# Patient Record
Sex: FEMALE | Race: WHITE | NOT HISPANIC OR LATINO | Employment: FULL TIME | URBAN - METROPOLITAN AREA
[De-identification: names, ages, dates, MRNs, and addresses within clinical notes are randomized per-mention and may not be internally consistent; named-entity substitution may affect disease eponyms.]

---

## 2018-10-04 ENCOUNTER — HISTORICAL (OUTPATIENT)
Dept: ADMINISTRATIVE | Facility: HOSPITAL | Age: 48
End: 2018-10-04

## 2018-10-04 LAB
ABS NEUT (OLG): 2.1
ALBUMIN SERPL-MCNC: 4.4 GM/DL (ref 3.4–5)
ALBUMIN/GLOB SERPL: 1.52 {RATIO} (ref 1.5–2.5)
ALP SERPL-CCNC: 91 UNIT/L (ref 38–126)
ALT SERPL-CCNC: 17 UNIT/L (ref 7–52)
AST SERPL-CCNC: 23 UNIT/L (ref 15–37)
BILIRUB SERPL-MCNC: 0.5 MG/DL (ref 0.2–1)
BILIRUBIN DIRECT+TOT PNL SERPL-MCNC: 0.1 MG/DL (ref 0–0.5)
BILIRUBIN DIRECT+TOT PNL SERPL-MCNC: 0.4 MG/DL
BUN SERPL-MCNC: 6 MG/DL (ref 7–18)
CALCIUM SERPL-MCNC: 9.2 MG/DL (ref 8.5–10)
CHLORIDE SERPL-SCNC: 101 MMOL/L (ref 98–107)
CO2 SERPL-SCNC: 31 MMOL/L (ref 21–32)
CREAT SERPL-MCNC: 0.72 MG/DL (ref 0.6–1.3)
ERYTHROCYTE [DISTWIDTH] IN BLOOD BY AUTOMATED COUNT: 13 % (ref 11.5–17)
GLOBULIN SER-MCNC: 2.8 GM/DL (ref 1.2–3)
GLUCOSE SERPL-MCNC: 102 MG/DL (ref 74–106)
HCT VFR BLD AUTO: 44.5 % (ref 37–47)
HGB BLD-MCNC: 14.3 GM/DL (ref 12–16)
LYMPHOCYTES # BLD AUTO: 2.9 X10(3)/MCL (ref 0.6–3.4)
LYMPHOCYTES NFR BLD AUTO: 51.9 % (ref 13–40)
MCH RBC QN AUTO: 31.1 PG (ref 27–31.2)
MCHC RBC AUTO-ENTMCNC: 32 GM/DL (ref 32–36)
MCV RBC AUTO: 97 FL (ref 80–94)
MONOCYTES # BLD AUTO: 0.5 X10(3)/MCL (ref 0–1.8)
MONOCYTES NFR BLD AUTO: 8.4 % (ref 0.1–24)
NEUTROPHILS NFR BLD AUTO: 39.7 % (ref 47–80)
PLATELET # BLD AUTO: 184 X10(3)/MCL (ref 130–400)
PMV BLD AUTO: 10.2 FL
POTASSIUM SERPL-SCNC: 4.6 MMOL/L (ref 3.5–5.1)
PROT SERPL-MCNC: 7.3 GM/DL (ref 6.4–8.2)
RBC # BLD AUTO: 4.6 X10(6)/MCL (ref 4.2–5.4)
SODIUM SERPL-SCNC: 139 MMOL/L (ref 136–145)
TSH SERPL-ACNC: 1.87 MIU/ML (ref 0.35–4.94)
WBC # SPEC AUTO: 5.5 X10(3)/MCL (ref 4.5–11.5)

## 2019-12-24 LAB
INFLUENZA A ANTIGEN, POC: NEGATIVE
INFLUENZA B ANTIGEN, POC: NEGATIVE
RAPID GROUP A STREP (OHS): NEGATIVE

## 2021-03-16 LAB — RAPID GROUP A STREP (OHS): POSITIVE

## 2021-04-28 LAB — RAPID GROUP A STREP (OHS): NEGATIVE

## 2021-06-14 ENCOUNTER — HISTORICAL (OUTPATIENT)
Dept: ADMINISTRATIVE | Facility: HOSPITAL | Age: 51
End: 2021-06-14

## 2022-04-10 ENCOUNTER — HISTORICAL (OUTPATIENT)
Dept: ADMINISTRATIVE | Facility: HOSPITAL | Age: 52
End: 2022-04-10

## 2022-04-28 VITALS
BODY MASS INDEX: 38.33 KG/M2 | SYSTOLIC BLOOD PRESSURE: 96 MMHG | OXYGEN SATURATION: 97 % | WEIGHT: 208.31 LBS | DIASTOLIC BLOOD PRESSURE: 54 MMHG | HEIGHT: 62 IN

## 2022-05-03 NOTE — HISTORICAL [facility]
This is a historical note converted from Isaiah. Formatting and pictures may have been removed.  Please reference Isaiah for original formatting and attached multimedia. Chief Complaint  DIARRHEA SINCE SUNDAY. SWEATING PROFUSELY ALL DAY YESTERDAY. HAS 2 SYNCOPE EPISODES YESTERDAY. CONCNEREND ABOUT DEHYDRATION.  History of Present Illness  Patient is here today?for evaluation of syncopal episode?that happened x2 yesterday.? She reports that since Sunday?she has had diarrhea?but no fever. ?No nausea vomiting.? She went to the walk-in clinic yesterday?and was evaluated?but referred to the emergency room where?further workup?could be performed.? She has not had any further episodes of syncope since yesterday.? She does report having?similar episodes of syncope?in the past?and she?has always been told?that it was possibly related to dehydration.? She denies any palpitations?but does usually have?a low blood pressure?as her baseline.? She has not had any further diarrhea since?yesterday.  ?  ?  Review of Systems  General:?No fever  HEENT: Noncontributory  Cardiac:?No chest pain or palpitations  GI:?Diarrhea? as HPI, no bleeding, no abdominal pain, no nausea vomiting  : Noncontributory  Neuro:?She has had a history of syncope in the past?that was worked up?without any?underlying cause discovered, no history of seizure disorder  Physical Exam  Vitals & Measurements  HR:?74(Peripheral)? BP:?102/66?  HT:?157.4?cm? HT:?157.4?cm? WT:?49.8?kg? WT:?49.8?kg? BMI:?20.1?  General:?No apparent distress, alert and oriented  HEENT: Mucous membranes are moist, PERRLA, EOMI  Chest: Clear to auscultation bilaterally  Cardiac: Regular rate and rhythm  Abdomen:?Soft nontender nondistended, normal bowel sounds x4  Assessment/Plan  1.?Syncopal episodes  CBC, CMP, TSH, EkG within normal limits.? Explained to patient?that it is very common?to have syncopal episodes without?an underlying?cause discovered.? It is possible?since her blood  pressure?at a baseline?is on the low side?and any fluctuation in her fluid status/dehydration as?may occur with?diarrhea, ?might make her more susceptible to fainting.? Recommend that she try adding?additional salt?to her diet.? Discussed referral for cardiac evaluation,?such as Holter or tilt table?but will hold off on this for now?and if it continues to be a frequent problem?patient will notify me?to send referral or if recurs?in the situation that is not related to dehydration.  Ordered:  Comprehensive Metabolic Panel, Routine collect, 10/04/18 15:55:00 CDT, Blood, Stop date 10/04/18 15:55:00 CDT, Lab Collect, Syncopal episodes, 10/04/18 15:55:00 CDT  Office/Outpatient Visit Level 3 Established 14806 PC, Diarrhea  Syncopal episodes, [facility], 10/04/18 15:53:00 CDT  Thyroid Stimulating Hormone, Routine collect, 10/04/18 15:55:00 CDT, Blood, Stop date 10/04/18 15:55:00 CDT, Lab Collect, Syncopal episodes  Diarrhea, 10/04/18 15:55:00 CDT  ?  2.?Diarrhea  Resolved  Ordered:  Office/Outpatient Visit Level 3 Established 59378 PC, Diarrhea  Syncopal episodes, [facility], 10/04/18 15:53:00 CDT  Thyroid Stimulating Hormone, Routine collect, 10/04/18 15:55:00 CDT, Blood, Stop date 10/04/18 15:55:00 CDT, Lab Collect, Syncopal episodes  Diarrhea, 10/04/18 15:55:00 CDT  ?  Orders:  Clinic Follow-up PRN, 10/04/18 15:53:00 CDT, [facility], Future Order   Problem List/Past Medical History  Ongoing  Diarrhea  Insomnia  Restless legs syndrome  Syncopal episodes  Historical  Endometriosis(  Confirmed  )  Procedure/Surgical History  BREAST AUGMENTATION (08/30/2018)  hysterectomy (2015)  Endometriosis   Medications  Allegra, Oral  KlonoPIN 1 mg oral tablet, 1 mg= 1 tab(s), Oral, TID  Vitamin C 1000 mg oral tablet, 1000 mg= 1 tab(s), Oral, Daily  Allergies  No Known Allergies  Social History  Alcohol  Current, Beer, Wine, Liquor, 3-5 times per week, 03/28/2018  Substance Abuse  Never, 03/28/2018  Tobacco  Never  smoker, 05/11/2015  Family History  Diabetes mellitus type 2: Father.  Health Maintenance  Health Maintenance  ???Pending?(in the next year)  ??? ??Due?  ??? ? ? ?ADL Screening due??10/04/18??and every 1??year(s)  ??? ? ? ?Alcohol Misuse Screening due??10/04/18??and every 1??year(s)  ??? ? ? ?Cervical Cancer Screening due??10/04/18??and every?  ??? ? ? ?Depression Screening due??10/04/18??and every?  ??? ? ? ?Diabetes Screening due??10/04/18??and every?  ??? ? ? ?Influenza Vaccine due??10/04/18??and every?  ??? ? ? ?Lipid Screening due??10/04/18??and every?  ??? ? ? ?Tetanus Vaccine due??10/04/18??and every 10??year(s)  ???Satisfied?(in the past 1 year)  ??? ??Satisfied?  ??? ? ? ?Blood Pressure Screening on??10/04/18.??Satisfied by Melina Sharma.  ??? ? ? ?Body Mass Index Check on??10/04/18.??Satisfied by Melina Sharma  ??? ? ? ?Diabetes Screening on??10/04/18.??Satisfied by Daryn Umana MD  ??? ? ? ?Influenza Vaccine on??10/04/18.??Satisfied by Melina Sharma  ??? ? ? ?Obesity Screening on??10/04/18.??Satisfied by Melina Sharma  ?  ?

## 2022-05-12 PROCEDURE — 85651 RBC SED RATE NONAUTOMATED: CPT | Performed by: FAMILY MEDICINE

## 2022-05-12 PROCEDURE — 86617 LYME DISEASE ANTIBODY: CPT | Performed by: FAMILY MEDICINE

## 2022-05-12 PROCEDURE — 86431 RHEUMATOID FACTOR QUANT: CPT | Performed by: FAMILY MEDICINE

## 2022-06-23 ENCOUNTER — OFFICE VISIT (OUTPATIENT)
Dept: URGENT CARE | Facility: CLINIC | Age: 52
End: 2022-06-23
Payer: COMMERCIAL

## 2022-06-23 VITALS
BODY MASS INDEX: 23.62 KG/M2 | WEIGHT: 128.38 LBS | HEART RATE: 60 BPM | HEIGHT: 62 IN | OXYGEN SATURATION: 99 % | TEMPERATURE: 99 F | RESPIRATION RATE: 18 BRPM | DIASTOLIC BLOOD PRESSURE: 75 MMHG | SYSTOLIC BLOOD PRESSURE: 114 MMHG

## 2022-06-23 DIAGNOSIS — J02.9 SORE THROAT: ICD-10-CM

## 2022-06-23 DIAGNOSIS — R05.9 COUGH: Primary | ICD-10-CM

## 2022-06-23 DIAGNOSIS — J32.9 SINUSITIS, UNSPECIFIED CHRONICITY, UNSPECIFIED LOCATION: ICD-10-CM

## 2022-06-23 DIAGNOSIS — R52 BODY ACHES: ICD-10-CM

## 2022-06-23 LAB
CTP QC/QA: YES
POC MOLECULAR INFLUENZA A AGN: NEGATIVE
POC MOLECULAR INFLUENZA B AGN: NEGATIVE
S PYO RRNA THROAT QL PROBE: NEGATIVE
SARS-COV-2 RDRP RESP QL NAA+PROBE: NEGATIVE

## 2022-06-23 PROCEDURE — 3008F BODY MASS INDEX DOCD: CPT | Mod: CPTII,,, | Performed by: FAMILY MEDICINE

## 2022-06-23 PROCEDURE — 99213 OFFICE O/P EST LOW 20 MIN: CPT | Mod: S$PBB,25,, | Performed by: FAMILY MEDICINE

## 2022-06-23 PROCEDURE — U0002 COVID-19 LAB TEST NON-CDC: HCPCS | Mod: QW,,, | Performed by: FAMILY MEDICINE

## 2022-06-23 PROCEDURE — 87880 STREP A ASSAY W/OPTIC: CPT | Mod: QW,,, | Performed by: FAMILY MEDICINE

## 2022-06-23 PROCEDURE — 96372 THER/PROPH/DIAG INJ SC/IM: CPT | Mod: S$PBB,,, | Performed by: FAMILY MEDICINE

## 2022-06-23 PROCEDURE — 87880 POCT RAPID STREP A: ICD-10-PCS | Mod: QW,,, | Performed by: FAMILY MEDICINE

## 2022-06-23 PROCEDURE — 99213 PR OFFICE/OUTPT VISIT, EST, LEVL III, 20-29 MIN: ICD-10-PCS | Mod: S$PBB,25,, | Performed by: FAMILY MEDICINE

## 2022-06-23 PROCEDURE — 3078F DIAST BP <80 MM HG: CPT | Mod: CPTII,,, | Performed by: FAMILY MEDICINE

## 2022-06-23 PROCEDURE — 3078F PR MOST RECENT DIASTOLIC BLOOD PRESSURE < 80 MM HG: ICD-10-PCS | Mod: CPTII,,, | Performed by: FAMILY MEDICINE

## 2022-06-23 PROCEDURE — 87502 INFLUENZA DNA AMP PROBE: CPT | Mod: QW,,, | Performed by: FAMILY MEDICINE

## 2022-06-23 PROCEDURE — 1160F RVW MEDS BY RX/DR IN RCRD: CPT | Mod: CPTII,,, | Performed by: FAMILY MEDICINE

## 2022-06-23 PROCEDURE — U0002: ICD-10-PCS | Mod: QW,,, | Performed by: FAMILY MEDICINE

## 2022-06-23 PROCEDURE — 1160F PR REVIEW ALL MEDS BY PRESCRIBER/CLIN PHARMACIST DOCUMENTED: ICD-10-PCS | Mod: CPTII,,, | Performed by: FAMILY MEDICINE

## 2022-06-23 PROCEDURE — 3008F PR BODY MASS INDEX (BMI) DOCUMENTED: ICD-10-PCS | Mod: CPTII,,, | Performed by: FAMILY MEDICINE

## 2022-06-23 PROCEDURE — 1159F PR MEDICATION LIST DOCUMENTED IN MEDICAL RECORD: ICD-10-PCS | Mod: CPTII,,, | Performed by: FAMILY MEDICINE

## 2022-06-23 PROCEDURE — 87502 POCT INFLUENZA A/B MOLECULAR: ICD-10-PCS | Mod: QW,,, | Performed by: FAMILY MEDICINE

## 2022-06-23 PROCEDURE — 1159F MED LIST DOCD IN RCRD: CPT | Mod: CPTII,,, | Performed by: FAMILY MEDICINE

## 2022-06-23 PROCEDURE — 3074F PR MOST RECENT SYSTOLIC BLOOD PRESSURE < 130 MM HG: ICD-10-PCS | Mod: CPTII,,, | Performed by: FAMILY MEDICINE

## 2022-06-23 PROCEDURE — 3074F SYST BP LT 130 MM HG: CPT | Mod: CPTII,,, | Performed by: FAMILY MEDICINE

## 2022-06-23 PROCEDURE — 96372 PR INJECTION,THERAP/PROPH/DIAG2ST, IM OR SUBCUT: ICD-10-PCS | Mod: S$PBB,,, | Performed by: FAMILY MEDICINE

## 2022-06-23 RX ORDER — PREDNISONE 10 MG/1
30 TABLET ORAL DAILY
Qty: 15 TABLET | Refills: 0 | Status: SHIPPED | OUTPATIENT
Start: 2022-06-23 | End: 2022-06-28

## 2022-06-23 RX ORDER — ROPINIROLE 0.5 MG/1
TABLET, FILM COATED ORAL
COMMUNITY
Start: 2022-04-25 | End: 2022-07-26 | Stop reason: SDUPTHER

## 2022-06-23 RX ORDER — DOXYCYCLINE 100 MG/1
100 CAPSULE ORAL 2 TIMES DAILY
Qty: 14 CAPSULE | Refills: 0 | Status: SHIPPED | OUTPATIENT
Start: 2022-06-23 | End: 2022-06-30

## 2022-06-23 RX ORDER — ESCITALOPRAM OXALATE 10 MG/1
5 TABLET ORAL
COMMUNITY
End: 2023-02-07 | Stop reason: SDUPTHER

## 2022-06-23 RX ORDER — LEVOTHYROXINE SODIUM 50 UG/1
50 TABLET ORAL DAILY
COMMUNITY
Start: 2022-04-17 | End: 2022-07-26

## 2022-06-23 RX ORDER — DEXAMETHASONE SODIUM PHOSPHATE 100 MG/10ML
10 INJECTION INTRAMUSCULAR; INTRAVENOUS
Status: COMPLETED | OUTPATIENT
Start: 2022-06-23 | End: 2022-06-23

## 2022-06-23 RX ORDER — ESTRADIOL 1 MG/1
1 TABLET ORAL DAILY
COMMUNITY
Start: 2022-04-17 | End: 2023-02-07

## 2022-06-23 RX ADMIN — DEXAMETHASONE SODIUM PHOSPHATE 10 MG: 100 INJECTION INTRAMUSCULAR; INTRAVENOUS at 01:06

## 2022-06-23 NOTE — PROGRESS NOTES
"Subjective:       Patient ID: Brenda Turk is a 51 y.o. female.    Vitals:  height is 5' 2" (1.575 m) and weight is 58.2 kg (128 lb 6.4 oz). Her oral temperature is 98.5 °F (36.9 °C). Her blood pressure is 114/75 and her pulse is 60. Her respiration is 18 and oxygen saturation is 99%.     Chief Complaint: Headache (X 2 days), Cough (X yesterday), Fatigue ( X 2 days), Sore Throat (X 2 days), and Generalized Body Aches (X today)    51-year-old female presents to clinic complaining of a headache, sore throat and fatigue started x 2 days ago. Pt stated the cough started yesterday and the body aches started x today. No medications was taken for symptoms. Symptoms have worsen throughout the days.  Denies any fever shortness of breath vomiting or diarrhea.  States she did have some coworkers test positive for COVID recently.    Headache   This is a new problem. Episode onset: x 2 days ago. The problem has been gradually worsening. The pain is located in the temporal region. The pain does not radiate. Quality: pressure* The pain is at a severity of 4/10. The pain is mild. Associated symptoms include coughing and a sore throat. Nothing aggravates the symptoms. The treatment provided no relief.   Cough  This is a new problem. The current episode started yesterday. The problem has been gradually worsening. The cough is non-productive. Associated symptoms include headaches and a sore throat. Nothing aggravates the symptoms. She has tried nothing for the symptoms. The treatment provided no relief.   Fatigue  This is a new problem. Episode onset: x 2 days ago. The problem has been gradually worsening. Associated symptoms include coughing, fatigue, headaches and a sore throat. Nothing aggravates the symptoms. She has tried nothing for the symptoms. The treatment provided no relief.   Sore Throat   This is a new problem. Episode onset: x 2 days ago. The problem has been gradually worsening. There has been no fever. The pain is at " a severity of 4/10. The pain is mild. Associated symptoms include coughing and headaches. She has tried nothing for the symptoms. The treatment provided no relief.       Constitution: Positive for fatigue.   HENT: Positive for sore throat.    Cardiovascular: Negative.    Eyes: Negative.    Respiratory: Positive for cough.    Gastrointestinal: Negative.    Genitourinary: Negative.    Musculoskeletal: Negative.    Skin: Negative.    Allergic/Immunologic: Negative.    Neurological: Positive for headaches.   Hematologic/Lymphatic: Negative.        Objective:      Physical Exam   Constitutional: She is oriented to person, place, and time.   HENT:   Head: Normocephalic and atraumatic.   Mouth/Throat: No posterior oropharyngeal erythema (Postnasal drip is present).   Pulmonary/Chest: Effort normal and breath sounds normal. No stridor. No respiratory distress. She has no wheezes. She has no rhonchi. She has no rales.   Abdominal: Normal appearance.   Neurological: She is alert and oriented to person, place, and time.   Psychiatric: Her behavior is normal. Mood, judgment and thought content normal.   Vitals reviewed.         Previous History      Review of patient's allergies indicates:   Allergen Reactions    Penicillins        History reviewed. No pertinent past medical history.  Current Outpatient Medications   Medication Instructions    doxycycline (MONODOX) 100 mg, Oral, 2 times daily    EScitalopram oxalate (LEXAPRO) 10 mg, Oral    EScitalopram oxalate (LEXAPRO) 5 mg, Oral    estradioL (ESTRACE) 1 mg, Oral, Daily    levothyroxine (SYNTHROID) 50 mcg, Oral, Daily    predniSONE (DELTASONE) 30 mg, Oral, Daily    rOPINIRole (REQUIP) 0.5 MG tablet TAKE 1 TO 2 TABLETS BY MOUTH AT BEDTIME     Past Surgical History:   Procedure Laterality Date    HYSTERECTOMY      TONSILLECTOMY       History reviewed. No pertinent family history.    Social History     Tobacco Use    Smoking status: Never Smoker    Smokeless tobacco:  "Never Used   Substance Use Topics    Alcohol use: Yes        Physical Exam      Vital Signs Reviewed   /75 (BP Location: Left arm, Patient Position: Sitting)   Pulse 60   Temp 98.5 °F (36.9 °C) (Oral)   Resp 18   Ht 5' 2" (1.575 m)   Wt 58.2 kg (128 lb 6.4 oz)   SpO2 99%   BMI 23.48 kg/m²        Procedures    Procedures     Labs     Results for orders placed or performed in visit on 06/23/22   POCT COVID-19 Rapid Screening   Result Value Ref Range    POC Rapid COVID Negative Negative     Acceptable Yes    POCT Influenza A/B MOLECULAR   Result Value Ref Range    POC Molecular Influenza A Ag Negative Negative, Not Reported    POC Molecular Influenza B Ag Negative Negative, Not Reported     Acceptable Yes    POCT rapid strep A   Result Value Ref Range    Rapid Strep A Screen Negative Negative     Acceptable Yes          Assessment:       1. Cough    2. Sore throat    3. Body aches    4. Sinusitis, unspecified chronicity, unspecified location          Plan:       COVID negative flu negative strep negative  Medications sent to pharmacy  Start steroids tomorrow morning  It would be reasonable to hold antibiotics for 3-4 days and start if symptoms persist  Start taking an allergy pill daily such as claritin, zyrtec, allegrea or xyzal. Also start using a nasal steroid spray such as flonase or nasacort daily. If you are not being treated for high blood pressure, you can also take decongestant such as sudafed as needed. They can be purchased over the counter. Monitor for fever. Take tylenol/acetaminophen or ibuprofen as needed. Rest and hydrate. If symptoms persist or worsen, return to clinic or seek medical attention immediately.         Cough  -     POCT COVID-19 Rapid Screening  -     POCT Influenza A/B MOLECULAR  -     POCT rapid strep A    Sore throat  -     POCT COVID-19 Rapid Screening  -     POCT Influenza A/B MOLECULAR  -     POCT rapid strep A    Body " aches  -     POCT COVID-19 Rapid Screening  -     POCT Influenza A/B MOLECULAR  -     POCT rapid strep A    Sinusitis, unspecified chronicity, unspecified location    Other orders  -     dexamethasone injection 10 mg  -     predniSONE (DELTASONE) 10 MG tablet; Take 3 tablets (30 mg total) by mouth once daily. for 5 days  Dispense: 15 tablet; Refill: 0  -     doxycycline (MONODOX) 100 MG capsule; Take 1 capsule (100 mg total) by mouth 2 (two) times daily. for 7 days  Dispense: 14 capsule; Refill: 0

## 2022-06-23 NOTE — PATIENT INSTRUCTIONS
COVID negative flu negative strep negative  Medications sent to pharmacy  Start steroids tomorrow morning  It would be reasonable to hold antibiotics for 3-4 days and start if symptoms persist  Start taking an allergy pill daily such as claritin, zyrtec, allegrea or xyzal. Also start using a nasal steroid spray such as flonase or nasacort daily. If you are not being treated for high blood pressure, you can also take decongestant such as sudafed as needed. They can be purchased over the counter. Monitor for fever. Take tylenol/acetaminophen or ibuprofen as needed. Rest and hydrate. If symptoms persist or worsen, return to clinic or seek medical attention immediately.

## 2022-07-08 PROCEDURE — 86225 DNA ANTIBODY NATIVE: CPT | Performed by: FAMILY MEDICINE

## 2022-07-26 PROBLEM — Z95.0 CARDIAC PACEMAKER IN SITU: Status: ACTIVE | Noted: 2021-10-21

## 2022-07-26 PROBLEM — G25.81 RESTLESS LEGS SYNDROME: Status: ACTIVE | Noted: 2022-07-26

## 2022-07-26 PROBLEM — G47.00 INSOMNIA: Status: ACTIVE | Noted: 2022-07-26

## 2022-07-26 PROBLEM — E03.8 SUBCLINICAL HYPOTHYROIDISM: Status: ACTIVE | Noted: 2022-07-26

## 2022-09-16 ENCOUNTER — HISTORICAL (OUTPATIENT)
Dept: ADMINISTRATIVE | Facility: HOSPITAL | Age: 52
End: 2022-09-16
Payer: COMMERCIAL

## 2022-09-17 ENCOUNTER — HISTORICAL (OUTPATIENT)
Dept: ADMINISTRATIVE | Facility: HOSPITAL | Age: 52
End: 2022-09-17
Payer: COMMERCIAL

## 2023-01-11 ENCOUNTER — OFFICE VISIT (OUTPATIENT)
Dept: URGENT CARE | Facility: CLINIC | Age: 53
End: 2023-01-11
Payer: COMMERCIAL

## 2023-01-11 VITALS
WEIGHT: 145 LBS | DIASTOLIC BLOOD PRESSURE: 74 MMHG | HEART RATE: 70 BPM | TEMPERATURE: 99 F | RESPIRATION RATE: 18 BRPM | HEIGHT: 62 IN | SYSTOLIC BLOOD PRESSURE: 112 MMHG | OXYGEN SATURATION: 97 % | BODY MASS INDEX: 26.68 KG/M2

## 2023-01-11 DIAGNOSIS — J11.1 INFLUENZA-LIKE ILLNESS: Primary | ICD-10-CM

## 2023-01-11 DIAGNOSIS — R52 BODY ACHES: ICD-10-CM

## 2023-01-11 LAB
CTP QC/QA: YES
MOLECULAR STREP A: NEGATIVE
POC MOLECULAR INFLUENZA A AGN: NEGATIVE
POC MOLECULAR INFLUENZA B AGN: NEGATIVE
SARS-COV-2 RDRP RESP QL NAA+PROBE: NEGATIVE

## 2023-01-11 PROCEDURE — 87651 POCT STREP A MOLECULAR: ICD-10-PCS | Mod: QW,,, | Performed by: PHYSICIAN ASSISTANT

## 2023-01-11 PROCEDURE — 87651 STREP A DNA AMP PROBE: CPT | Mod: QW,,, | Performed by: PHYSICIAN ASSISTANT

## 2023-01-11 PROCEDURE — 3078F DIAST BP <80 MM HG: CPT | Mod: CPTII,,, | Performed by: PHYSICIAN ASSISTANT

## 2023-01-11 PROCEDURE — 87635 SARS-COV-2 COVID-19 AMP PRB: CPT | Mod: QW,,, | Performed by: PHYSICIAN ASSISTANT

## 2023-01-11 PROCEDURE — 99213 OFFICE O/P EST LOW 20 MIN: CPT | Mod: ,,, | Performed by: PHYSICIAN ASSISTANT

## 2023-01-11 PROCEDURE — 1159F MED LIST DOCD IN RCRD: CPT | Mod: CPTII,,, | Performed by: PHYSICIAN ASSISTANT

## 2023-01-11 PROCEDURE — 3074F PR MOST RECENT SYSTOLIC BLOOD PRESSURE < 130 MM HG: ICD-10-PCS | Mod: CPTII,,, | Performed by: PHYSICIAN ASSISTANT

## 2023-01-11 PROCEDURE — 1160F PR REVIEW ALL MEDS BY PRESCRIBER/CLIN PHARMACIST DOCUMENTED: ICD-10-PCS | Mod: CPTII,,, | Performed by: PHYSICIAN ASSISTANT

## 2023-01-11 PROCEDURE — 3078F PR MOST RECENT DIASTOLIC BLOOD PRESSURE < 80 MM HG: ICD-10-PCS | Mod: CPTII,,, | Performed by: PHYSICIAN ASSISTANT

## 2023-01-11 PROCEDURE — 3008F PR BODY MASS INDEX (BMI) DOCUMENTED: ICD-10-PCS | Mod: CPTII,,, | Performed by: PHYSICIAN ASSISTANT

## 2023-01-11 PROCEDURE — 1159F PR MEDICATION LIST DOCUMENTED IN MEDICAL RECORD: ICD-10-PCS | Mod: CPTII,,, | Performed by: PHYSICIAN ASSISTANT

## 2023-01-11 PROCEDURE — 3008F BODY MASS INDEX DOCD: CPT | Mod: CPTII,,, | Performed by: PHYSICIAN ASSISTANT

## 2023-01-11 PROCEDURE — 1160F RVW MEDS BY RX/DR IN RCRD: CPT | Mod: CPTII,,, | Performed by: PHYSICIAN ASSISTANT

## 2023-01-11 PROCEDURE — 87635: ICD-10-PCS | Mod: QW,,, | Performed by: PHYSICIAN ASSISTANT

## 2023-01-11 PROCEDURE — 87502 POCT INFLUENZA A/B MOLECULAR: ICD-10-PCS | Mod: QW,,, | Performed by: PHYSICIAN ASSISTANT

## 2023-01-11 PROCEDURE — 3074F SYST BP LT 130 MM HG: CPT | Mod: CPTII,,, | Performed by: PHYSICIAN ASSISTANT

## 2023-01-11 PROCEDURE — 87502 INFLUENZA DNA AMP PROBE: CPT | Mod: QW,,, | Performed by: PHYSICIAN ASSISTANT

## 2023-01-11 PROCEDURE — 99213 PR OFFICE/OUTPT VISIT, EST, LEVL III, 20-29 MIN: ICD-10-PCS | Mod: ,,, | Performed by: PHYSICIAN ASSISTANT

## 2023-01-11 RX ORDER — ESTRADIOL 2 MG/1
TABLET ORAL
COMMUNITY
Start: 2022-08-22

## 2023-01-11 NOTE — PATIENT INSTRUCTIONS
Drink plenty of fluids.     Get plenty of rest.     Tylenol as needed.     Go to the ER with any significant change or worsening of symptoms.     Follow up with your primary care doctor.

## 2023-01-11 NOTE — PROGRESS NOTES
"Subjective:       Patient ID: Brenda Turk is a 52 y.o. female.    Vitals:  height is 5' 2" (1.575 m) and weight is 65.8 kg (145 lb). Her temperature is 99 °F (37.2 °C). Her blood pressure is 112/74 and her pulse is 70. Her respiration is 18 and oxygen saturation is 97%.     Chief Complaint: Generalized Body Aches (Pt presents to clinic with c/o cough, sneezing, body aches, sore throat starting a few weeks ago, worse in the last two days. )    Patient is a 52-year-old female who reports having mild upper respiratory symptoms starting a few weeks ago which did resolve.  Two days ago she began having chills body aches coughing sneezing and sore throat.  Denies any shortness of breath.    ROS    Objective:      Physical Exam   Constitutional: She is oriented to person, place, and time. She appears well-developed. She is cooperative.  Non-toxic appearance. She does not appear ill. No distress.   HENT:   Head: Normocephalic and atraumatic.   Ears:   Right Ear: Hearing, tympanic membrane, external ear and ear canal normal.   Left Ear: Hearing, tympanic membrane, external ear and ear canal normal.   Nose: Nose normal. No nasal deformity. No epistaxis.   Mouth/Throat: Uvula is midline and mucous membranes are normal. No trismus in the jaw. Normal dentition. No uvula swelling. Posterior oropharyngeal erythema present. No oropharyngeal exudate or posterior oropharyngeal edema.   Eyes: Conjunctivae and lids are normal. No scleral icterus.   Neck: Trachea normal and phonation normal. Neck supple. No edema present. No erythema present. No neck rigidity present.   Cardiovascular: Normal rate, regular rhythm, normal heart sounds and normal pulses.   Pulmonary/Chest: Effort normal and breath sounds normal. No respiratory distress. She has no decreased breath sounds. She has no rhonchi.   Abdominal: Normal appearance.   Musculoskeletal: Normal range of motion.         General: No deformity. Normal range of motion.   Neurological: " "She is alert and oriented to person, place, and time. She exhibits normal muscle tone. Coordination normal.   Skin: Skin is warm, dry, intact, not diaphoretic and not pale.   Psychiatric: Her speech is normal and behavior is normal. Judgment and thought content normal.   Nursing note and vitals reviewed.             Previous History      Review of patient's allergies indicates:   Allergen Reactions    Penicillins        Past Medical History:   Diagnosis Date    Complete heart block     Contact dermatitis     Diarrhea     Endometriosis, unspecified     Restless leg syndrome     Subclinical hypothyroidism      Current Outpatient Medications   Medication Instructions    EScitalopram oxalate (LEXAPRO) 5 mg, Oral    estradioL (ESTRACE) 2 MG tablet No dose, route, or frequency recorded.    estradioL (ESTRACE) 1 mg, Oral, Daily    rOPINIRole (REQUIP) 1 mg, Oral, Nightly     Past Surgical History:   Procedure Laterality Date    AUGMENTATION OF BREAST  08/30/2018    COLONOSCOPY  01/12/2022    repeat 10 years, Dr. Nunez    HYSTERECTOMY  2015    INSERTION OF PACEMAKER  10/21/2021    SURGICAL REMOVAL OF ENDOMETRIOSIS      TONSILLECTOMY       Family History   Problem Relation Age of Onset    No Known Problems Mother         unknown hx    Pulmonary fibrosis Father 79    Diabetes Father        Social History     Tobacco Use    Smoking status: Never    Smokeless tobacco: Never   Substance Use Topics    Alcohol use: Yes        Physical Exam      Vital Signs Reviewed   /74   Pulse 70   Temp 99 °F (37.2 °C)   Resp 18   Ht 5' 2" (1.575 m)   Wt 65.8 kg (145 lb)   SpO2 97%   BMI 26.52 kg/m²        Procedures    Procedures     Labs     Results for orders placed or performed in visit on 01/11/23   POCT COVID-19 Rapid Screening   Result Value Ref Range    POC Rapid COVID Negative Negative     Acceptable Yes    POCT Influenza A/B Molecular   Result Value Ref Range    POC Molecular Influenza A Ag Negative " Negative, Not Reported    POC Molecular Influenza B Ag Negative Negative, Not Reported     Acceptable Yes    POCT Strep A, Molecular   Result Value Ref Range    Molecular Strep A, POC Negative Negative     Acceptable Yes      Assessment:       1. Influenza-like illness    2. Body aches            Plan:         Influenza-like illness    Body aches  -     POCT COVID-19 Rapid Screening  -     POCT Influenza A/B Molecular  -     POCT Strep A, Molecular    Drink plenty of fluids.     Get plenty of rest.     Tylenol  as needed.     Go to the ER with any significant change or worsening of symptoms.     Follow up with your primary care doctor.

## 2023-01-25 PROBLEM — F41.9 ANXIETY: Status: ACTIVE | Noted: 2023-01-25

## 2023-01-25 PROBLEM — Z00.00 WELLNESS EXAMINATION: Status: ACTIVE | Noted: 2023-01-25

## 2023-03-13 ENCOUNTER — OFFICE VISIT (OUTPATIENT)
Dept: URGENT CARE | Facility: CLINIC | Age: 53
End: 2023-03-13
Payer: COMMERCIAL

## 2023-03-13 VITALS
SYSTOLIC BLOOD PRESSURE: 106 MMHG | OXYGEN SATURATION: 100 % | DIASTOLIC BLOOD PRESSURE: 73 MMHG | HEIGHT: 62 IN | WEIGHT: 143 LBS | HEART RATE: 74 BPM | BODY MASS INDEX: 26.31 KG/M2 | TEMPERATURE: 98 F | RESPIRATION RATE: 18 BRPM

## 2023-03-13 DIAGNOSIS — R31.9 URINARY TRACT INFECTION WITH HEMATURIA, SITE UNSPECIFIED: Primary | ICD-10-CM

## 2023-03-13 DIAGNOSIS — M54.9 BACK PAIN, UNSPECIFIED BACK LOCATION, UNSPECIFIED BACK PAIN LATERALITY, UNSPECIFIED CHRONICITY: ICD-10-CM

## 2023-03-13 DIAGNOSIS — N39.0 URINARY TRACT INFECTION WITH HEMATURIA, SITE UNSPECIFIED: Primary | ICD-10-CM

## 2023-03-13 LAB
BILIRUB UR QL STRIP: NEGATIVE
GLUCOSE UR QL STRIP: NEGATIVE
KETONES UR QL STRIP: NEGATIVE
LEUKOCYTE ESTERASE UR QL STRIP: POSITIVE
PH, POC UA: 6.5
POC BLOOD, URINE: POSITIVE
POC NITRATES, URINE: NEGATIVE
PROT UR QL STRIP: POSITIVE
SP GR UR STRIP: 1.01 (ref 1–1.03)
UROBILINOGEN UR STRIP-ACNC: 2 (ref 0.1–1.1)

## 2023-03-13 PROCEDURE — 81003 URINALYSIS AUTO W/O SCOPE: CPT | Mod: QW,,, | Performed by: PHYSICIAN ASSISTANT

## 2023-03-13 PROCEDURE — 99213 PR OFFICE/OUTPT VISIT, EST, LEVL III, 20-29 MIN: ICD-10-PCS | Mod: ,,, | Performed by: PHYSICIAN ASSISTANT

## 2023-03-13 PROCEDURE — 99213 OFFICE O/P EST LOW 20 MIN: CPT | Mod: ,,, | Performed by: PHYSICIAN ASSISTANT

## 2023-03-13 PROCEDURE — 87077 CULTURE AEROBIC IDENTIFY: CPT | Performed by: PHYSICIAN ASSISTANT

## 2023-03-13 PROCEDURE — 81003 POCT URINALYSIS, DIPSTICK, AUTOMATED, W/O SCOPE: ICD-10-PCS | Mod: QW,,, | Performed by: PHYSICIAN ASSISTANT

## 2023-03-13 RX ORDER — NABUMETONE 500 MG/1
500 TABLET, FILM COATED ORAL 2 TIMES DAILY PRN
Qty: 20 TABLET | Refills: 0 | Status: SHIPPED | OUTPATIENT
Start: 2023-03-13 | End: 2023-03-23

## 2023-03-13 RX ORDER — NITROFURANTOIN 25; 75 MG/1; MG/1
100 CAPSULE ORAL 2 TIMES DAILY
Qty: 14 CAPSULE | Refills: 0 | Status: SHIPPED | OUTPATIENT
Start: 2023-03-13 | End: 2023-03-16

## 2023-03-13 RX ORDER — CYCLOBENZAPRINE HCL 10 MG
10 TABLET ORAL 3 TIMES DAILY PRN
Qty: 15 TABLET | Refills: 0 | Status: SHIPPED | OUTPATIENT
Start: 2023-03-13 | End: 2023-03-23

## 2023-03-13 RX ORDER — PHENAZOPYRIDINE HYDROCHLORIDE 200 MG/1
200 TABLET, FILM COATED ORAL 3 TIMES DAILY PRN
Qty: 9 TABLET | Refills: 0 | Status: SHIPPED | OUTPATIENT
Start: 2023-03-13 | End: 2023-03-16

## 2023-03-13 NOTE — PATIENT INSTRUCTIONS
Complete full course of antibiotics.      Drink plenty of water daily. Avoid soda.    Follow up with your primary care doctor as needed.    Present to the nearest Emergency Department with any significant change or worsening of symptoms including but not limited to blood in urine, nausea/vomiting, abdominal pain, fever, body aches, chills, or flank pain.     Do not take Motrin/Advil/Aleve while taking nabumetone.  Take nabumetone with food to avoid upset stomach

## 2023-03-13 NOTE — PROGRESS NOTES
"Subjective:       Patient ID: Brenda Turk is a 52 y.o. female.    Vitals:  height is 5' 2" (1.575 m) and weight is 64.9 kg (143 lb). Her oral temperature is 97.7 °F (36.5 °C). Her blood pressure is 106/73 and her pulse is 74. Her respiration is 18 and oxygen saturation is 100%.     Chief Complaint: Back Pain    Lower right sided back pain x4-5 days, got worse last night.  Burning with urination, frequent urination, urgency x2 weeks. Has been taking cystex OTC for 1 week with mild relief.      Patient is a 52-year-old female who complains of a 2 week history of dysuria and urinary urgency/frequency.  She is without relief from over-the-counter AZO.  Patient also reports doing yd work and then developed right-sided lower back pain approximately 45 days ago.  She states that the pain significantly worsens with muscular movements.  She denies any radiation of pain.  Denies any fever    ROS    Objective:      Physical Exam   Constitutional: She is oriented to person, place, and time. She appears well-developed.   HENT:   Head: Normocephalic and atraumatic.   Ears:   Right Ear: External ear normal.   Left Ear: External ear normal.   Nose: Nose normal.   Mouth/Throat: Mucous membranes are normal.   Eyes: Conjunctivae and lids are normal.   Neck: Trachea normal. Neck supple.   Cardiovascular: Normal rate, regular rhythm and normal heart sounds.   Pulmonary/Chest: Effort normal and breath sounds normal. No respiratory distress.   Abdominal: Normal appearance and bowel sounds are normal. She exhibits no distension and no mass. Soft. There is no abdominal tenderness. There is no left CVA tenderness and no right CVA tenderness.   Musculoskeletal:         General: Tenderness present.   Neurological: She is alert and oriented to person, place, and time. She has normal strength.   Skin: Skin is warm, dry, intact, not diaphoretic and not pale.   Psychiatric: Her speech is normal and behavior is normal. Judgment and thought " content normal.   Nursing note and vitals reviewed.        Right lower lumbar paraspinous muscle area with TTP.  Lumbar range of motion limited secondary to pain.         Previous History      Review of patient's allergies indicates:   Allergen Reactions    Midodrine      Other reaction(s): Propensity to adverse reactions to drug    Penicillins        Past Medical History:   Diagnosis Date    Complete heart block     Contact dermatitis     Diarrhea     Endometriosis, unspecified     Restless leg syndrome     Subclinical hypothyroidism      Current Outpatient Medications   Medication Instructions    cyclobenzaprine (FLEXERIL) 10 mg, Oral, 3 times daily PRN    EScitalopram oxalate (LEXAPRO) 5 mg, Oral, Daily    estradioL (ESTRACE) 2 MG tablet No dose, route, or frequency recorded.    magnesium oxide (MAG-OX) 400 mg (241.3 mg magnesium) tablet Oral    nabumetone (RELAFEN) 500 mg, Oral, 2 times daily PRN    nitrofurantoin, macrocrystal-monohydrate, (MACROBID) 100 MG capsule 100 mg, Oral, 2 times daily    phenazopyridine (PYRIDIUM) 200 mg, Oral, 3 times daily PRN    potassium chloride (KLOR-CON) 10 MEQ TbSR TAKE 1 TABLET BY MOUTH IN THE MORNING ONLY ON MONDAY AND THURSDAY    rOPINIRole (REQUIP) 1 mg, Oral, Nightly     Past Surgical History:   Procedure Laterality Date    AUGMENTATION OF BREAST  08/30/2018    COLONOSCOPY  01/12/2022    repeat 10 years, Dr. Nunez    HYSTERECTOMY  2015    INSERTION OF PACEMAKER  10/21/2021    SURGICAL REMOVAL OF ENDOMETRIOSIS      TONSILLECTOMY       Family History   Problem Relation Age of Onset    No Known Problems Mother         unknown hx    Pulmonary fibrosis Father 79    Diabetes Father     No Known Problems Brother        Social History     Tobacco Use    Smoking status: Never    Smokeless tobacco: Never   Substance Use Topics    Alcohol use: Yes     Comment: Occasional    Drug use: Never        Physical Exam      Vital Signs Reviewed   /73   Pulse 74   Temp 97.7 °F  "(36.5 °C) (Oral)   Resp 18   Ht 5' 2" (1.575 m)   Wt 64.9 kg (143 lb)   SpO2 100%   BMI 26.16 kg/m²        Procedures    Procedures     Labs     Results for orders placed or performed in visit on 03/13/23   POCT Urinalysis, Dipstick, Automated, W/O Scope   Result Value Ref Range    POC Blood, Urine Positive (A) Negative    POC Bilirubin, Urine Negative Negative    POC Urobilinogen, Urine 2 (A) 0.1 - 1.1    POC Ketones, Urine Negative Negative    POC Protein, Urine Positive (A) Negative    POC Nitrates, Urine Negative Negative    POC Glucose, Urine Negative Negative    pH, UA 6.5     POC Specific Gravity, Urine 1.015 1.003 - 1.029    POC Leukocytes, Urine Positive (A) Negative     Assessment:       1. Urinary tract infection with hematuria, site unspecified    2. Back pain, unspecified back location, unspecified back pain laterality, unspecified chronicity            Plan:         Urinary tract infection with hematuria, site unspecified  -     Urine culture  -     nitrofurantoin, macrocrystal-monohydrate, (MACROBID) 100 MG capsule; Take 1 capsule (100 mg total) by mouth 2 (two) times daily. for 7 days  Dispense: 14 capsule; Refill: 0  -     phenazopyridine (PYRIDIUM) 200 MG tablet; Take 1 tablet (200 mg total) by mouth 3 (three) times daily as needed for Pain.  Dispense: 9 tablet; Refill: 0    Back pain, unspecified back location, unspecified back pain laterality, unspecified chronicity  -     POCT Urinalysis, Dipstick, Automated, W/O Scope  -     cyclobenzaprine (FLEXERIL) 10 MG tablet; Take 1 tablet (10 mg total) by mouth 3 (three) times daily as needed for Muscle spasms.  Dispense: 15 tablet; Refill: 0  -     nabumetone (RELAFEN) 500 MG tablet; Take 1 tablet (500 mg total) by mouth 2 (two) times daily as needed for Pain.  Dispense: 20 tablet; Refill: 0         Complete full course of antibiotics.      Drink plenty of water daily. Avoid soda.    Follow up with your primary care doctor as needed.    Present to " the nearest Emergency Department with any significant change or worsening of symptoms including but not limited to blood in urine, nausea/vomiting, abdominal pain, fever, body aches, chills, or flank pain.     Do not take Motrin/Advil/Aleve while taking nabumetone.  Take nabumetone with food to avoid upset stomach

## 2023-03-14 LAB — BACTERIA UR CULT: ABNORMAL

## 2023-03-16 ENCOUNTER — TELEPHONE (OUTPATIENT)
Dept: URGENT CARE | Facility: CLINIC | Age: 53
End: 2023-03-16

## 2023-03-16 RX ORDER — NITROFURANTOIN 25; 75 MG/1; MG/1
100 CAPSULE ORAL 2 TIMES DAILY
Qty: 14 CAPSULE | Refills: 0 | Status: SHIPPED | OUTPATIENT
Start: 2023-03-16 | End: 2023-03-17

## 2023-03-16 RX ORDER — PHENAZOPYRIDINE HYDROCHLORIDE 200 MG/1
200 TABLET, FILM COATED ORAL 3 TIMES DAILY PRN
Qty: 9 TABLET | Refills: 0 | Status: SHIPPED | OUTPATIENT
Start: 2023-03-16 | End: 2023-03-18

## 2023-03-16 NOTE — TELEPHONE ENCOUNTER
03/16/2023 Spoke w/ pt and advised that Pyridium is not an abx and advised that she was prescribed Macrobid. She called her pharmacy and was told she did not receive the abx because they did not have any in stock. Per Keli Forman NP I advised pt abx would be sent to another pharmacy (abx sent to [facility] per pt request), take and complete abx as prescribed if symptoms persist and/or worsen follow up immediately w/ PCP, urgent care or ED. Pt verbalized thanks and understanding. BW

## 2023-03-17 ENCOUNTER — OFFICE VISIT (OUTPATIENT)
Dept: URGENT CARE | Facility: CLINIC | Age: 53
End: 2023-03-17
Payer: COMMERCIAL

## 2023-03-17 VITALS
HEIGHT: 62 IN | RESPIRATION RATE: 20 BRPM | BODY MASS INDEX: 26.31 KG/M2 | SYSTOLIC BLOOD PRESSURE: 99 MMHG | TEMPERATURE: 99 F | HEART RATE: 94 BPM | DIASTOLIC BLOOD PRESSURE: 66 MMHG | OXYGEN SATURATION: 97 % | WEIGHT: 143 LBS

## 2023-03-17 DIAGNOSIS — R31.9 URINARY TRACT INFECTION WITH HEMATURIA, SITE UNSPECIFIED: Primary | ICD-10-CM

## 2023-03-17 DIAGNOSIS — N39.0 URINARY TRACT INFECTION WITH HEMATURIA, SITE UNSPECIFIED: Primary | ICD-10-CM

## 2023-03-17 DIAGNOSIS — R50.9 FEVER, UNSPECIFIED FEVER CAUSE: ICD-10-CM

## 2023-03-17 DIAGNOSIS — R05.9 COUGH, UNSPECIFIED TYPE: ICD-10-CM

## 2023-03-17 LAB
CTP QC/QA: YES
CTP QC/QA: YES
POC MOLECULAR INFLUENZA A AGN: NEGATIVE
POC MOLECULAR INFLUENZA B AGN: NEGATIVE
SARS-COV-2 RDRP RESP QL NAA+PROBE: NEGATIVE

## 2023-03-17 PROCEDURE — 96372 PR INJECTION,THERAP/PROPH/DIAG2ST, IM OR SUBCUT: ICD-10-PCS | Mod: ,,,

## 2023-03-17 PROCEDURE — 87502 INFLUENZA DNA AMP PROBE: CPT | Mod: QW,,,

## 2023-03-17 PROCEDURE — 96372 THER/PROPH/DIAG INJ SC/IM: CPT | Mod: ,,,

## 2023-03-17 PROCEDURE — 99214 OFFICE O/P EST MOD 30 MIN: CPT | Mod: 25,,,

## 2023-03-17 PROCEDURE — 87635: ICD-10-PCS | Mod: QW,,,

## 2023-03-17 PROCEDURE — 87502 POCT INFLUENZA A/B MOLECULAR: ICD-10-PCS | Mod: QW,,,

## 2023-03-17 PROCEDURE — 87635 SARS-COV-2 COVID-19 AMP PRB: CPT | Mod: QW,,,

## 2023-03-17 PROCEDURE — 99214 PR OFFICE/OUTPT VISIT, EST, LEVL IV, 30-39 MIN: ICD-10-PCS | Mod: 25,,,

## 2023-03-17 RX ORDER — SULFAMETHOXAZOLE AND TRIMETHOPRIM 800; 160 MG/1; MG/1
1 TABLET ORAL 2 TIMES DAILY
Qty: 14 TABLET | Refills: 0 | Status: SHIPPED | OUTPATIENT
Start: 2023-03-17 | End: 2023-03-31

## 2023-03-17 RX ORDER — ACETAMINOPHEN 500 MG
500 TABLET ORAL
Status: COMPLETED | OUTPATIENT
Start: 2023-03-17 | End: 2023-03-17

## 2023-03-17 RX ORDER — CEFTRIAXONE 1 G/1
1 INJECTION, POWDER, FOR SOLUTION INTRAMUSCULAR; INTRAVENOUS
Status: COMPLETED | OUTPATIENT
Start: 2023-03-17 | End: 2023-03-17

## 2023-03-17 RX ORDER — PROMETHAZINE HYDROCHLORIDE AND DEXTROMETHORPHAN HYDROBROMIDE 6.25; 15 MG/5ML; MG/5ML
5 SYRUP ORAL EVERY 6 HOURS PRN
Qty: 118 ML | Refills: 0 | Status: SHIPPED | OUTPATIENT
Start: 2023-03-17 | End: 2023-03-27

## 2023-03-17 RX ORDER — ONDANSETRON 4 MG/1
4 TABLET, ORALLY DISINTEGRATING ORAL EVERY 8 HOURS PRN
Qty: 15 TABLET | Refills: 0 | Status: SHIPPED | OUTPATIENT
Start: 2023-03-17

## 2023-03-17 RX ADMIN — CEFTRIAXONE 1 G: 1 INJECTION, POWDER, FOR SOLUTION INTRAMUSCULAR; INTRAVENOUS at 12:03

## 2023-03-17 RX ADMIN — Medication 500 MG: at 12:03

## 2023-03-17 NOTE — PROGRESS NOTES
"Subjective:       Patient ID: Brenda Turk is a 52 y.o. female.    Vitals:  height is 5' 2" (1.575 m) and weight is 64.9 kg (143 lb). Her oral temperature is 99.1 °F (37.3 °C). Her blood pressure is 99/66 and her pulse is 94. Her respiration is 20 and oxygen saturation is 97%.     Chief Complaint: Cough (Pt c/o fever of 102.2, coughing and nausea onset last night. Pt was seen Monday for UTI pt stated that she started taking her antibiotic yesterday. Pt c/o burning when urinating and mild pain. )    Patient is a 52-year-old female past medical history of complete heart block status post cardiac pacemaker, anxiety, restless legs syndrome who presents to clinic with complaints of fever, 102.2 prior to arrival.  Patient also reports associated nausea and headache.  Patient reports coughing, postnasal drip began last night with subjective fever symptoms. Patient was seen 4 days ago and diagnosed with UTI with urinalysis and urine culture positive for >/= 100,000 colonies/ml Staphylococcus saprophyticus.  She was prescribed Pyridium and Macrobid, however patient did not get antibiotic from the pharmacy until yesterday.  She has taken 1 dose.  Patient is still complaining of burning with urination.  She does report bladder discomfort, spasms, and lower back pain, which she had at last visit.  Denies localized abdominal pain, flank pain.  Denies vomiting or diarrhea.  Patient denies taking any medications prior to arrival for fever.    Cough  Associated symptoms include a fever and postnasal drip.     Constitution: Positive for fever.   HENT:  Positive for postnasal drip.    Respiratory:  Positive for cough.    Gastrointestinal:  Positive for nausea.   Genitourinary:  Positive for dysuria and frequency.     Objective:      Physical Exam   Constitutional: She is oriented to person, place, and time. She appears well-developed.   HENT:   Head: Normocephalic and atraumatic.   Ears:   Right Ear: Tympanic membrane, external ear " and ear canal normal.   Left Ear: Tympanic membrane, external ear and ear canal normal.   Nose: Nose normal.   Mouth/Throat: Mucous membranes are normal. Mucous membranes are moist. Posterior oropharyngeal erythema present.      Comments: Postnasal drip  Eyes: Conjunctivae and lids are normal.   Neck: Trachea normal. Neck supple.   Cardiovascular: Normal rate, regular rhythm and normal heart sounds.   Pulmonary/Chest: Effort normal and breath sounds normal. No respiratory distress. She has no wheezes. She has no rales.   Abdominal: Normal appearance and bowel sounds are normal. She exhibits no distension and no mass. Soft. There is abdominal tenderness in the suprapubic area. There is no rebound, no guarding, no left CVA tenderness and no right CVA tenderness.      Comments: Mild suprapubic tenderness   Musculoskeletal: Normal range of motion.         General: Normal range of motion.   Lymphadenopathy:     She has no cervical adenopathy.   Neurological: She is alert and oriented to person, place, and time. She has normal strength.   Skin: Skin is warm, dry, intact, not diaphoretic and not pale.   Psychiatric: Her speech is normal and behavior is normal. Judgment and thought content normal.   Nursing note and vitals reviewed.      Assessment:       1. Urinary tract infection with hematuria, site unspecified    2. Cough, unspecified type    3. Fever, unspecified fever cause          Plan:         Urinary tract infection with hematuria, site unspecified  -     sulfamethoxazole-trimethoprim 800-160mg (BACTRIM DS) 800-160 mg Tab; Take 1 tablet by mouth 2 (two) times daily. for 14 days  Dispense: 14 tablet; Refill: 0  -     cefTRIAXone injection 1 g  -     ondansetron (ZOFRAN-ODT) 4 MG TbDL; Take 1 tablet (4 mg total) by mouth every 8 (eight) hours as needed (nausea).  Dispense: 15 tablet; Refill: 0    Cough, unspecified type  -     POCT COVID-19 Rapid Screening  -     POCT Influenza A/B Molecular  -      promethazine-dextromethorphan (PROMETHAZINE-DM) 6.25-15 mg/5 mL Syrp; Take 5 mLs by mouth every 6 (six) hours as needed (cough).  Dispense: 118 mL; Refill: 0    Fever, unspecified fever cause  -     acetaminophen tablet 500 mg    Negative flu, COVID  Patient with penicillin allergy, reports allergy is itching, denies previous anaphylaxis or edema allergic reaction.  Unsure of any cephalosporin use in the past.  Will give Rocephin injection and monitor patient after, repeat vitals    Patient's blood pressure mildly low with repeat vital signs, patient reports systolic is usually 90s with diastolic usually between around 60s.        Drink plenty of fluids. Get plenty of rest.   Claritin or Zyrtec 10 mg for nasal congestion.  Nasal spray such as Nasacort or Flonase for congestion.  Prescription cough syrup nightly as it may make you drowsy.   Discontinue Macrobid, present to pharmacy and  Bactrim antibiotic.  Take antibiotics until complete, 2 week course.     Complete full course of antibiotics.      Drink plenty of water daily. Avoid soda.  May alternate Tylenol or ibuprofen every 3 hours for fever, body aches or headache.    Follow up with your primary care doctor as needed.  Present to the nearest Emergency Department with any significant change or worsening of symptoms including but not limited to blood in urine, vomiting, abdominal pain, persistent fever, body aches, chills, or flank pain.

## 2023-03-17 NOTE — PATIENT INSTRUCTIONS
Negative flu, COVID    Drink plenty of fluids. Get plenty of rest.   Claritin or Zyrtec 10 mg for nasal congestion.  Nasal spray such as Nasacort or Flonase for congestion.  Prescription cough syrup nightly as it may make you drowsy.    Discontinue Macrobid, present to pharmacy and  Bactrim antibiotic.  Take antibiotics until complete, 2 week course.  May alternate Tylenol or ibuprofen every 3 hours for fever, body aches or headache.    Complete full course of antibiotics.      Drink plenty of water daily. Avoid soda.    Follow up with your primary care doctor as needed.    Present to the nearest Emergency Department with any significant change or worsening of symptoms including but not limited to blood in urine, vomiting, abdominal pain, persistent fever, body aches, chills, or flank pain.

## 2023-05-15 PROBLEM — Z00.00 WELLNESS EXAMINATION: Status: RESOLVED | Noted: 2023-01-25 | Resolved: 2023-05-15

## 2023-10-06 PROCEDURE — 83880 ASSAY OF NATRIURETIC PEPTIDE: CPT | Performed by: FAMILY MEDICINE

## 2023-10-06 PROCEDURE — 85379 FIBRIN DEGRADATION QUANT: CPT | Performed by: FAMILY MEDICINE

## 2023-10-06 PROCEDURE — 84482 T3 REVERSE: CPT | Performed by: FAMILY MEDICINE

## 2023-10-06 PROCEDURE — 83735 ASSAY OF MAGNESIUM: CPT | Performed by: FAMILY MEDICINE

## 2023-10-06 PROCEDURE — 85652 RBC SED RATE AUTOMATED: CPT | Performed by: FAMILY MEDICINE

## 2023-10-06 PROCEDURE — 86141 C-REACTIVE PROTEIN HS: CPT | Performed by: FAMILY MEDICINE

## 2023-10-06 PROCEDURE — 82533 TOTAL CORTISOL: CPT | Performed by: FAMILY MEDICINE

## 2024-02-06 PROBLEM — R55 SYNCOPE AND COLLAPSE: Status: ACTIVE | Noted: 2019-06-04

## 2024-02-06 PROBLEM — Z23 IMMUNIZATION DUE: Status: ACTIVE | Noted: 2024-02-06

## 2024-05-13 PROBLEM — Z00.00 ENCOUNTER FOR WELLNESS EXAMINATION IN ADULT: Status: RESOLVED | Noted: 2023-01-25 | Resolved: 2024-05-13

## 2024-07-14 PROBLEM — E78.00 HYPERCHOLESTEROLEMIA: Status: ACTIVE | Noted: 2024-07-14

## 2024-10-14 PROBLEM — Z00.00 ENCOUNTER FOR WELLNESS EXAMINATION IN ADULT: Status: RESOLVED | Noted: 2023-01-25 | Resolved: 2024-10-14

## 2024-11-12 PROBLEM — E66.3 OVERWEIGHT (BMI 25.0-29.9): Status: ACTIVE | Noted: 2024-11-12

## 2025-01-05 ENCOUNTER — OFFICE VISIT (OUTPATIENT)
Dept: URGENT CARE | Facility: CLINIC | Age: 55
End: 2025-01-05
Payer: COMMERCIAL

## 2025-01-05 VITALS
BODY MASS INDEX: 26.5 KG/M2 | TEMPERATURE: 97 F | RESPIRATION RATE: 18 BRPM | WEIGHT: 144 LBS | HEART RATE: 74 BPM | DIASTOLIC BLOOD PRESSURE: 66 MMHG | SYSTOLIC BLOOD PRESSURE: 105 MMHG | OXYGEN SATURATION: 100 % | HEIGHT: 62 IN

## 2025-01-05 DIAGNOSIS — L30.9 DERMATITIS: Primary | ICD-10-CM

## 2025-01-05 PROCEDURE — 96372 THER/PROPH/DIAG INJ SC/IM: CPT | Mod: ,,, | Performed by: PHYSICIAN ASSISTANT

## 2025-01-05 PROCEDURE — 99213 OFFICE O/P EST LOW 20 MIN: CPT | Mod: 25,,, | Performed by: PHYSICIAN ASSISTANT

## 2025-01-05 RX ORDER — PREDNISONE 20 MG/1
20 TABLET ORAL 2 TIMES DAILY
Qty: 10 TABLET | Refills: 0 | Status: SHIPPED | OUTPATIENT
Start: 2025-01-05 | End: 2025-01-10

## 2025-01-05 RX ORDER — TRIAMCINOLONE ACETONIDE 5 MG/G
OINTMENT TOPICAL 2 TIMES DAILY
Qty: 15 G | Refills: 1 | Status: SHIPPED | OUTPATIENT
Start: 2025-01-05

## 2025-01-05 RX ORDER — DEXAMETHASONE SODIUM PHOSPHATE 10 MG/ML
10 INJECTION INTRAMUSCULAR; INTRAVENOUS
Status: COMPLETED | OUTPATIENT
Start: 2025-01-05 | End: 2025-01-05

## 2025-01-05 RX ADMIN — DEXAMETHASONE SODIUM PHOSPHATE 10 MG: 10 INJECTION INTRAMUSCULAR; INTRAVENOUS at 09:01

## 2025-01-05 NOTE — PROGRESS NOTES
"Subjective:      Patient ID: Brenda Turk is a 54 y.o. female.    Vitals:  height is 5' 2" (1.575 m) and weight is 65.3 kg (144 lb). Her tympanic temperature is 96.8 °F (36 °C). Her blood pressure is 105/66 and her pulse is 74. Her respiration is 18 and oxygen saturation is 100%.     Chief Complaint: Rash     Patient is a 54 y.o. female who presents to urgent care with complaints of itchy rash all over body. X1 wk     ROS   Objective:     Physical Exam   Constitutional: She is oriented to person, place, and time. She appears well-developed. She is cooperative.  Non-toxic appearance. She does not appear ill. No distress.   HENT:   Head: Normocephalic and atraumatic.   Ears:   Right Ear: Hearing normal.   Left Ear: Hearing normal.   Eyes: Conjunctivae and lids are normal. No scleral icterus.   Neck: Trachea normal and phonation normal. Neck supple. No edema present. No erythema present. No neck rigidity present.   Cardiovascular: Normal rate, regular rhythm, normal heart sounds and normal pulses.   Pulmonary/Chest: Effort normal and breath sounds normal. No respiratory distress. She has no decreased breath sounds. She has no rhonchi.   Abdominal: Normal appearance.   Musculoskeletal: Normal range of motion.         General: No deformity. Normal range of motion.   Neurological: She is alert and oriented to person, place, and time. She exhibits normal muscle tone. Coordination normal.   Skin: Skin is warm, dry, intact, not diaphoretic, not pale and rash.   Psychiatric: Her speech is normal and behavior is normal. Judgment and thought content normal.   Nursing note and vitals reviewed.  Erythematous rash with occasional wheals and excoriation marks noted on the upper extremities trunk and right ear.         Previous History      Review of patient's allergies indicates:   Allergen Reactions    Midodrine      Other reaction(s): Propensity to adverse reactions to drug    Penicillins        Past Medical History: " "  Diagnosis Date    Complete heart block     Contact dermatitis     Diarrhea     Endometriosis, unspecified     Restless leg syndrome     Subclinical hypothyroidism      Current Outpatient Medications   Medication Instructions    atorvastatin (LIPITOR) 20 mg, Daily    estradioL (ESTRACE) 2 MG tablet No dose, route, or frequency recorded.    naltrexone-bupropion (CONTRAVE) 8-90 mg TbSR 1 tab PO daily x7 days, then 1 tab BID PO x7 days, then 2 tabs in AM and 1 tab in PM PO x7 days, then 2 tabs BID PO.    naltrexone-bupropion (CONTRAVE) 8-90 mg TbSR 2 tablets, Oral, 2 times daily    predniSONE (DELTASONE) 20 mg, Oral, 2 times daily    rOPINIRole (REQUIP) 1 mg, Oral, Nightly    triamcinolone (KENALOG) 0.5 % ointment Topical (Top), 2 times daily    valACYclovir (VALTREX) 1000 MG tablet Take 2 tablets po bid x 1 day per outbreak     Past Surgical History:   Procedure Laterality Date    AUGMENTATION OF BREAST  08/30/2018    BREAST SURGERY  8/2018    COLONOSCOPY  01/12/2022    repeat 10 years, Dr. Nunez    HYSTERECTOMY  2015    INSERTION OF PACEMAKER  10/21/2021    LEFT HEART CATHETERIZATION  07/2024    neg [facility]    SURGICAL REMOVAL OF ENDOMETRIOSIS      TONSILLECTOMY       Family History   Problem Relation Name Age of Onset    No Known Problems Mother          unknown hx    Pulmonary fibrosis Father Freddy Turk 79    Diabetes Father Freddy Turk     No Known Problems Brother         Social History     Tobacco Use    Smoking status: Never    Smokeless tobacco: Never   Substance Use Topics    Alcohol use: Yes     Alcohol/week: 1.0 standard drink of alcohol     Types: 1 Glasses of wine per week     Comment: Occasional    Drug use: Never        Physical Exam      Vital Signs Reviewed   /66 (Patient Position: Sitting)   Pulse 74   Temp 96.8 °F (36 °C) (Tympanic)   Resp 18   Ht 5' 2" (1.575 m)   Wt 65.3 kg (144 lb)   SpO2 100%   BMI 26.34 kg/m²        Procedures    Procedures     Labs     Results for orders " placed or performed in visit on 07/15/24   TSH    Collection Time: 07/15/24  2:14 PM   Result Value Ref Range    TSH 3.892 0.350 - 4.940 uIU/mL   T3, Free ([facility])    Collection Time: 07/15/24  2:14 PM   Result Value Ref Range    T3 Free 3.02 1.45 - 3.48 pg/mL   T4, Free    Collection Time: 07/15/24  2:14 PM   Result Value Ref Range    Thyroxine Free 0.97 0.76 - 1.46 ng/dL       Assessment:     1. Dermatitis        Plan:       Dermatitis    Other orders  -     dexAMETHasone injection 10 mg  -     predniSONE (DELTASONE) 20 MG tablet; Take 1 tablet (20 mg total) by mouth 2 (two) times daily. for 5 days  Dispense: 10 tablet; Refill: 0  -     triamcinolone (KENALOG) 0.5 % ointment; Apply topically 2 (two) times daily.  Dispense: 15 g; Refill: 1    Take Claritin or Benadryl as directed per package instructions. May cause sedation/drowsiness (safety precautions discussed).  Follow-up with your Primary Care Provider as needed.   Go to the emergency department with any significant change or worsening of symptoms.  Please monitor for any signs of infection such as worsening redness, swelling, pain, purulent discharge, fever, body aches, or chills and seek follow-up care as needed.    Start prednisone tomorrow morning.   Start triamcinolone if needed after completion of the oral steroids.

## 2025-01-05 NOTE — PATIENT INSTRUCTIONS
Start prednisone tomorrow morning.   Start triamcinolone if needed after completion of the oral steroids.     Take Claritin or Benadryl as directed per package instructions. May cause sedation/drowsiness (safety precautions discussed).  Follow-up with your Primary Care Provider as needed.   Go to the emergency department with any significant change or worsening of symptoms.  Please monitor for any signs of infection such as worsening redness, swelling, pain, purulent discharge, fever, body aches, or chills and seek follow-up care as needed.

## 2025-01-13 PROBLEM — L29.9 GENERALIZED PRURITUS: Status: ACTIVE | Noted: 2025-01-13

## 2025-07-03 ENCOUNTER — TELEPHONE (OUTPATIENT)
Dept: PHARMACY | Facility: CLINIC | Age: 55
End: 2025-07-03
Payer: COMMERCIAL

## 2025-07-03 NOTE — TELEPHONE ENCOUNTER
[facility]/Population Health Chart Review & Patient Outreach Details For Medication Adherence Project    Reason for Outreach Encounter: 3rd Party payor non-compliance report ([employer], [employer], [employer], etc)  2.  Patient Outreach Method: Reviewed patient chart   3.   Medication in question:    Hyperlipidemia Medications              atorvastatin (LIPITOR) 20 MG tablet Take 20 mg by mouth once daily.                  atorvastatin  last filled  6/19/25 for 90 day supply      4.  Reviewed and or Updates Made To: Patient Chart  5. Outreach Outcomes and/or actions taken: Patient filled medication and is on track to be adherent  Additional Notes: